# Patient Record
Sex: FEMALE | Race: WHITE | NOT HISPANIC OR LATINO | Employment: FULL TIME | URBAN - METROPOLITAN AREA
[De-identification: names, ages, dates, MRNs, and addresses within clinical notes are randomized per-mention and may not be internally consistent; named-entity substitution may affect disease eponyms.]

---

## 2018-12-06 ENCOUNTER — HISTORICAL (OUTPATIENT)
Dept: LAB | Facility: HOSPITAL | Age: 18
End: 2018-12-06

## 2021-07-22 ENCOUNTER — HISTORICAL (OUTPATIENT)
Dept: ADMINISTRATIVE | Facility: HOSPITAL | Age: 21
End: 2021-07-22

## 2021-07-22 LAB
APPEARANCE, UA: ABNORMAL
BACTERIA #/AREA URNS AUTO: ABNORMAL /HPF
BILIRUB UR QL STRIP: ABNORMAL MG/DL
CHOLEST SERPL-MCNC: 259 MG/DL (ref 0–200)
CHOLEST/HDLC SERPL: 5 {RATIO}
COLOR UR: ABNORMAL
GLUCOSE (UA): NEGATIVE MG/DL
HDLC SERPL-MCNC: 52 MG/DL (ref 35–60)
HGB UR QL STRIP: NEGATIVE UNIT/L
KETONES UR QL STRIP: ABNORMAL MG/DL
LDLC SERPL CALC-MCNC: 195 MG/DL (ref 0–129)
LEUKOCYTE ESTERASE UR QL STRIP: NEGATIVE UNIT/L
NITRITE UR QL STRIP.AUTO: NEGATIVE
PH UR STRIP: 6 [PH]
PROT UR QL STRIP: ABNORMAL MG/DL
RBC #/AREA URNS HPF: ABNORMAL /HPF
SP GR UR STRIP: >1.03
SQUAMOUS EPITHELIAL, UA: ABNORMAL /LPF
TRIGL SERPL-MCNC: 138 MG/DL (ref 30–150)
TSH SERPL-ACNC: 1.26 MIU/ML (ref 0.35–4.94)
UROBILINOGEN UR STRIP-ACNC: 0.2 MG/DL
VLDLC SERPL CALC-MCNC: 27.6 MG/DL
WBC #/AREA URNS AUTO: ABNORMAL /[HPF]

## 2022-04-10 ENCOUNTER — HISTORICAL (OUTPATIENT)
Dept: ADMINISTRATIVE | Facility: HOSPITAL | Age: 22
End: 2022-04-10

## 2022-04-28 VITALS
SYSTOLIC BLOOD PRESSURE: 118 MMHG | BODY MASS INDEX: 25.55 KG/M2 | DIASTOLIC BLOOD PRESSURE: 74 MMHG | OXYGEN SATURATION: 99 % | HEIGHT: 62 IN | WEIGHT: 138.88 LBS

## 2022-05-03 NOTE — HISTORICAL [facility] CERNER
This is a historical note converted from Jael. Formatting and pictures may have been removed.  Please reference Jael for original formatting and attached multimedia. Chief Complaint  cpx/fasting. ?Discuss labwork for Protein C def and lupus anti-coag disorder. Sinus congestion that started this am.  History of Present Illness  Patient presents today for wellness CPX.? She is requesting a refill of?clindamycin?topical solution she is using?for acne.? She states it is working well for her acne.? She has a family history--father?with low protein C?and?lupus?anticoagulant disorder with history of DVT and PE.? She is inquiring about?genetic testing?for these 2 disorders.? She has nasal congestion and post nasal drainage x2 days.? No fever, cough, ear pressure/pain, sinus pressure.? She has no other?complaints or concerns today.? She denies chest pain or shortness of breath  Review of Systems  GENERAL: No weight loss, no?weight gain, no fever, no fatigue, no chills, no night sweats  HEENT: No sore throat, no ear pain, no sinus pressure,?+ nasal congestion, no rhinorrhea, no decreased hearing  VISION: No vision changes, no blurry vision, no double vision, no glaucoma, no cataracts, no glasses, no contacts  LAST EYE EXAM: never  NECK: no LAD  CARDIAC: No chest pain, no palpitations, no dyspnea on exertion, no orthopnea  RESPIRATORY: No cough, no wheezing, no sputum production, no?SOB  GI: No abdominal pain, no N/V, +intermittent constipation--diet controlled, no diarrhea, no blood in stool,?(- )?family history of Colon Ca  : No dysuria, no hematuria, no frequency, no urgency, no incontinence, no vaginal discharge or abnormal vaginal bleeding  MUSC/SKEL: No myalgia, no weakness, no edema, no arthralgia, no joint swelling/effusions  SKIN: No rashes, no hives, no itching, no sores  NEURO: No HA, no numbness, no tingling, no weakness, no dizziness  PSYCH: No anxiety, no depression, no?irritability, no panic attacks,?no  s/i, no h/i, no?hallucinations  ENDO: No polyuria, no polyphagia,? no polydipsia  HEME: No bruising, no bleeding disorders, no signs of anemia.?  Physical Exam  Vitals & Measurements  T:?36.9? ?C (Oral)? HR:?108(Peripheral)? BP:?118/74?  HT:?157.00?cm? WT:?63.000?kg? BMI:?25.56?  General: Well developed, well nourished in no apparent distress, alert and oriented x3  Skin:?No rash or abnormal?lesions  HEENT:?Normocephalic, PERRLA, EOMI, mouth WNL, throat WNL, nares normal, EAC and TM WNL bilaterally  Neck:?FROM, no LAD, no thyroid abnormalities?palpable  Chest:?CTA?bilaterally, no wheezes crackles or rubs  Cardiac: RRR,?no murmurs, rubs, gallops  Abdomen: Soft, nontender,?nondistended, NBSx4, no rebound tenderness or guarding, no HSM  Extremities:?No clubbing, cyanosis, or edema.? Joints WNL, +2 DP/PT pulses bilaterally  Neuro: No sensory or motor defects noted. CN II-XII intact. Gait WNL.  Genital: Defer to GYN  Assessment/Plan  1.?Wellness examination?Z00.00  Lipids, UA, TSH, lupus, protein C?ordered today.?CBC/CMP drawn 2 months ago---will get copy.  GYN: Genet JAMESON.  Encourage pt to increase cardiovascular exercise and attempt to obtain at least 150 minutes of moderate aerobic exercise per week or 75 minutes of vigorous aerobic exercise weekly.??  Weight bearing activities encouraged to increase bone density.?  Ordered:  Clinic Follow-Up Wellness, *Est. 07/22/22 3:00:00 CDT, Order for future visit, Wellness examination, ink AFP  Lab Collection Request, 07/22/21 10:55:00 CDT, [facility] - [facility], 07/22/21 10:55:00 CDT, Wellness examination  Acne  Family history of protein C deficiency  Family history of lupus anticoagulant disorder  Family history of DVT  Lipid Panel, Routine collect, 07/22/21 10:55:00 CDT, Blood, Order for future visit, Stop date 07/22/21 10:55:00 CDT, Lab Collect, Wellness examination  Family history of protein C deficiency  Family history of lupus anticoagulant disorder  Family history of  DVT...  [facility] Est 18-39 years [id number] PC, Wellness examination  Acne  Family history of protein C deficiency  Family history of lupus anticoagulant disorder  Family history of DVT, [facility] - [facility], 07/22/21 10:55:00 CDT  Thyroid Stimulating Hormone, Routine collect, 07/22/21 10:55:00 CDT, Blood, Order for future visit, Stop date 07/22/21 10:55:00 CDT, Lab Collect, Wellness examination  Acne  Family history of protein C deficiency  Family history of lupus anticoagulant disorder  Family history...  Urinalysis no Reflex, Routine collect, Urine, Order for future visit, 07/22/21 10:55:00 CDT, Stop date 07/22/21 10:55:00 CDT, Nurse collect, Wellness examination  ?  2.?Acne?L70.9  Clindamycin 1% topical solution refilled.  Ordered:  Lab Collection Request, 07/22/21 10:55:00 CDT, [facility] - [facility], 07/22/21 10:55:00 CDT, Wellness examination  Acne  Family history of protein C deficiency  Family history of lupus anticoagulant disorder  Family history of DVT  [facility] Est 18-39 years [id number] PC, Wellness examination  Acne  Family history of protein C deficiency  Family history of lupus anticoagulant disorder  Family history of DVT, [facility] - [facility], 07/22/21 10:55:00 CDT  Thyroid Stimulating Hormone, Routine collect, 07/22/21 10:55:00 CDT, Blood, Order for future visit, Stop date 07/22/21 10:55:00 CDT, Lab Collect, Wellness examination  Acne  Family history of protein C deficiency  Family history of lupus anticoagulant disorder  Family history...  ?  3.?Family history of protein C deficiency?Z83.2,?  Labs today.  Family history of lupus anticoagulant disorder?Z83.2  Labs today.  Ordered:  Lab Collection Request, 07/22/21 10:55:00 CDT, [facility] - [facility], 07/22/21 10:55:00 CDT, Wellness examination  Acne  Family history of protein C deficiency  Family history of lupus anticoagulant disorder  Family history of DVT  Lipid Panel, Routine collect, 07/22/21 10:55:00 CDT, Blood, Order  for future visit, Stop date 07/22/21 10:55:00 CDT, Lab Collect, Wellness examination  Family history of protein C deficiency  Family history of lupus anticoagulant disorder  Family history of DVT...  Lupus Anticoagulant, Routine collect, 07/22/21 10:55:00 CDT, Blood, Order for future visit, Stop date 07/22/21 10:55:00 CDT, Lab Collect, Family history of protein C deficiency  Family history of lupus anticoagulant disorder  Family history of DVT, 07/22/21 10:55:00 CDT  [facility] Est 18-39 years [id number] PC, Wellness examination  Acne  Family history of protein C deficiency  Family history of lupus anticoagulant disorder  Family history of DVT, [facility] - [facility], 07/22/21 10:55:00 CDT  Protein C, Functional-[facility] [id number], Now collect, 07/22/21 10:55:00 CDT, Blood, Order for future visit, Stop date 07/22/21 10:55:00 CDT, Lab Collect, Family history of protein C deficiency  Family history of lupus anticoagulant disorder  Family history of DVT, 07/22/21 10:55:00 CDT  Thrombin Time, Routine collect, 07/22/21 10:55:00 CDT, Blood, Order for future visit, Stop date 07/22/21 10:55:00 CDT, Lab Collect, Family history of protein C deficiency  Family history of lupus anticoagulant disorder  Family history of DVT, 07/22/21 10:55:00 CDT  Thyroid Stimulating Hormone, Routine collect, 07/22/21 10:55:00 CDT, Blood, Order for future visit, Stop date 07/22/21 10:55:00 CDT, Lab Collect, Wellness examination  Acne  Family history of protein C deficiency  Family history of lupus anticoagulant disorder  Family history...  ?  5.?Family history of DVT?Z82.49  Labs today.  Ordered:  Lab Collection Request, 07/22/21 10:55:00 CDT, [facility] - AFP, 07/22/21 10:55:00 CDT, Wellness examination  Acne  Family history of protein C deficiency  Family history of lupus anticoagulant disorder  Family history of DVT  Lipid Panel, Routine collect, 07/22/21 10:55:00 CDT, Blood, Order for future visit, Stop date 07/22/21  10:55:00 CDT, Lab Collect, Wellness examination  Family history of protein C deficiency  Family history of lupus anticoagulant disorder  Family history of DVT...  Lupus Anticoagulant, Routine collect, 07/22/21 10:55:00 CDT, Blood, Order for future visit, Stop date 07/22/21 10:55:00 CDT, Lab Collect, Family history of protein C deficiency  Family history of lupus anticoagulant disorder  Family history of DVT, 07/22/21 10:55:00 CDT  [facility] Est 18-39 years [id number] PC, Wellness examination  Acne  Family history of protein C deficiency  Family history of lupus anticoagulant disorder  Family history of DVT, [facility] - AFP, 07/22/21 10:55:00 CDT  Protein C, Functional-[facility] [id number], Now collect, 07/22/21 10:55:00 CDT, Blood, Order for future visit, Stop date 07/22/21 10:55:00 CDT, Lab Collect, Family history of protein C deficiency  Family history of lupus anticoagulant disorder  Family history of DVT, 07/22/21 10:55:00 CDT  Thrombin Time, Routine collect, 07/22/21 10:55:00 CDT, Blood, Order for future visit, Stop date 07/22/21 10:55:00 CDT, Lab Collect, Family history of protein C deficiency  Family history of lupus anticoagulant disorder  Family history of DVT, 07/22/21 10:55:00 CDT  Thyroid Stimulating Hormone, Routine collect, 07/22/21 10:55:00 CDT, Blood, Order for future visit, Stop date 07/22/21 10:55:00 CDT, Lab Collect, Wellness examination  Acne  Family history of protein C deficiency  Family history of lupus anticoagulant disorder  Family history...  ?  Orders:  clindamycin topical, 1 betty, TOP, Once a day (at bedtime), # 30 mL, 3 Refill(s), Pharmacy: [facility]/pharmacy #[id number], 157, cm, Height/Length Dosing, 07/22/21 10:30:00 CDT, 63, kg, Weight Dosing, 07/22/21 10:30:00 CDT  Inform patient to call the office if her symptoms of nasal congestion and postnasal drainage?are to worsen?or persist.? Recommend over-the-counter?cold and sinus?medications with Mucinex.? Flonase  daily.  Referrals  Clinic Follow-Up Wellness, *Est. 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  Hyperlipidemia.: Mother.  Immunizations  Vaccine Date Status   tuberculin purified protein derivative 12/08/2020 Given   influenza virus vaccine, inactivated 09/24/2020 Given   tuberculin purified protein derivative 12/17/2019 Given   meningococcal group B vaccine 12/17/2019 Given   influenza virus vaccine, inactivated 10/18/2019 Given   meningococcal group B vaccine 12/28/2018 Given   tetanus/diphtheria/pertussis, acel(Tdap) 12/28/2018 Given   influenza virus vaccine, inactivated 12/28/2018 Given   tuberculin purified protein derivative 12/26/2018 Given   tuberculin purified protein derivative 12/19/2018 Given   meningococcal conjugate vaccine 06/28/2016 Recorded   tetanus/diphtheria/pertussis, acel(Tdap) 07/25/2009 Recorded   varicella virus vaccine 07/25/2009 Recorded   meningococcal conjugate vaccine 07/25/2009 Recorded   diphtheria/pertussis, acel/tetanus ped 07/23/2003 Recorded   poliovirus vaccine,  inactivated 08/26/2002 Recorded   measles/mumps/rubella virus vaccine 08/26/2002 Recorded   poliovirus vaccine, live, trivalent 07/05/2000 Recorded   varicella virus vaccine 07/05/2000 Recorded   measles/mumps/rubella virus vaccine 07/05/2000 Recorded   hepatitis B pediatric vaccine 03/18/1999 Recorded   poliovirus vaccine, live, trivalent 1998 Recorded   poliovirus vaccine, live, trivalent 1998 Recorded   hepatitis B pediatric vaccine 1998 Recorded   hepatitis B pediatric vaccine 1998 Recorded   Health Maintenance  Health Maintenance  ???Pending?(in the next year)  ??? ??OverDue  ??? ? ? ?Influenza Vaccine due??10/01/20??and every 1??day(s)  ??? ? ? ?Alcohol Misuse Screening due??01/02/21??and every 1??year(s)  ??? ??Due?  ??? ? ? ?ADL Screening due??07/22/21??and every 1??year(s)  ??? ? ? ?Depression Screening due??07/22/21??Unknown Frequency  ??? ??Due In Future?  ??? ? ? ?Obesity Screening not due until??01/01/22??and every 1??year(s)  ???Satisfied?(in the past 1 year)  ??? ??Satisfied?  ??? ? ? ?Blood Pressure Screening on??07/22/21.??Satisfied by Elva Wing CMA.  ??? ? ? ?Body Mass Index Check on??07/22/21.??Satisfied by Elva Wing CMA L.  ??? ? ? ?Influenza Vaccine on??09/24/20.??Satisfied by Elva Wing CMA L.  ??? ? ? ?Obesity Screening on??07/22/21.??Satisfied by Elva Wing CMA L.  ?      Agree with constantino parks, Tx and f/u.